# Patient Record
Sex: FEMALE | Race: OTHER | HISPANIC OR LATINO | URBAN - METROPOLITAN AREA
[De-identification: names, ages, dates, MRNs, and addresses within clinical notes are randomized per-mention and may not be internally consistent; named-entity substitution may affect disease eponyms.]

---

## 2023-06-03 ENCOUNTER — EMERGENCY (EMERGENCY)
Facility: HOSPITAL | Age: 53
LOS: 0 days | Discharge: HOME | End: 2023-06-03
Attending: EMERGENCY MEDICINE
Payer: MEDICAID

## 2023-06-03 VITALS
DIASTOLIC BLOOD PRESSURE: 56 MMHG | OXYGEN SATURATION: 99 % | HEART RATE: 66 BPM | RESPIRATION RATE: 18 BRPM | TEMPERATURE: 99 F | SYSTOLIC BLOOD PRESSURE: 121 MMHG

## 2023-06-03 DIAGNOSIS — M79.89 OTHER SPECIFIED SOFT TISSUE DISORDERS: ICD-10-CM

## 2023-06-03 LAB
ALBUMIN SERPL ELPH-MCNC: 3.9 G/DL — SIGNIFICANT CHANGE UP (ref 3.5–5.2)
ALP SERPL-CCNC: 71 U/L — SIGNIFICANT CHANGE UP (ref 30–115)
ALT FLD-CCNC: 18 U/L — SIGNIFICANT CHANGE UP (ref 0–41)
ANION GAP SERPL CALC-SCNC: 9 MMOL/L — SIGNIFICANT CHANGE UP (ref 7–14)
AST SERPL-CCNC: 22 U/L — SIGNIFICANT CHANGE UP (ref 0–41)
BASOPHILS # BLD AUTO: 0.03 K/UL — SIGNIFICANT CHANGE UP (ref 0–0.2)
BASOPHILS NFR BLD AUTO: 0.5 % — SIGNIFICANT CHANGE UP (ref 0–1)
BILIRUB SERPL-MCNC: 0.6 MG/DL — SIGNIFICANT CHANGE UP (ref 0.2–1.2)
BUN SERPL-MCNC: 8 MG/DL — LOW (ref 10–20)
CALCIUM SERPL-MCNC: 8.8 MG/DL — SIGNIFICANT CHANGE UP (ref 8.4–10.5)
CHLORIDE SERPL-SCNC: 107 MMOL/L — SIGNIFICANT CHANGE UP (ref 98–110)
CO2 SERPL-SCNC: 24 MMOL/L — SIGNIFICANT CHANGE UP (ref 17–32)
CREAT SERPL-MCNC: 0.7 MG/DL — SIGNIFICANT CHANGE UP (ref 0.7–1.5)
EGFR: 104 ML/MIN/1.73M2 — SIGNIFICANT CHANGE UP
EOSINOPHIL # BLD AUTO: 0.11 K/UL — SIGNIFICANT CHANGE UP (ref 0–0.7)
EOSINOPHIL NFR BLD AUTO: 1.7 % — SIGNIFICANT CHANGE UP (ref 0–8)
GLUCOSE SERPL-MCNC: 111 MG/DL — HIGH (ref 70–99)
HCT VFR BLD CALC: 38.8 % — SIGNIFICANT CHANGE UP (ref 37–47)
HGB BLD-MCNC: 13.1 G/DL — SIGNIFICANT CHANGE UP (ref 12–16)
IMM GRANULOCYTES NFR BLD AUTO: 0.5 % — HIGH (ref 0.1–0.3)
LYMPHOCYTES # BLD AUTO: 1.82 K/UL — SIGNIFICANT CHANGE UP (ref 1.2–3.4)
LYMPHOCYTES # BLD AUTO: 28 % — SIGNIFICANT CHANGE UP (ref 20.5–51.1)
MCHC RBC-ENTMCNC: 29.6 PG — SIGNIFICANT CHANGE UP (ref 27–31)
MCHC RBC-ENTMCNC: 33.8 G/DL — SIGNIFICANT CHANGE UP (ref 32–37)
MCV RBC AUTO: 87.6 FL — SIGNIFICANT CHANGE UP (ref 81–99)
MONOCYTES # BLD AUTO: 0.61 K/UL — HIGH (ref 0.1–0.6)
MONOCYTES NFR BLD AUTO: 9.4 % — HIGH (ref 1.7–9.3)
NEUTROPHILS # BLD AUTO: 3.9 K/UL — SIGNIFICANT CHANGE UP (ref 1.4–6.5)
NEUTROPHILS NFR BLD AUTO: 59.9 % — SIGNIFICANT CHANGE UP (ref 42.2–75.2)
NRBC # BLD: 0 /100 WBCS — SIGNIFICANT CHANGE UP (ref 0–0)
PLATELET # BLD AUTO: 220 K/UL — SIGNIFICANT CHANGE UP (ref 130–400)
PMV BLD: 12.1 FL — HIGH (ref 7.4–10.4)
POTASSIUM SERPL-MCNC: 4.3 MMOL/L — SIGNIFICANT CHANGE UP (ref 3.5–5)
POTASSIUM SERPL-SCNC: 4.3 MMOL/L — SIGNIFICANT CHANGE UP (ref 3.5–5)
PROT SERPL-MCNC: 5.7 G/DL — LOW (ref 6–8)
RBC # BLD: 4.43 M/UL — SIGNIFICANT CHANGE UP (ref 4.2–5.4)
RBC # FLD: 13.3 % — SIGNIFICANT CHANGE UP (ref 11.5–14.5)
SODIUM SERPL-SCNC: 140 MMOL/L — SIGNIFICANT CHANGE UP (ref 135–146)
WBC # BLD: 6.5 K/UL — SIGNIFICANT CHANGE UP (ref 4.8–10.8)
WBC # FLD AUTO: 6.5 K/UL — SIGNIFICANT CHANGE UP (ref 4.8–10.8)

## 2023-06-03 PROCEDURE — 85025 COMPLETE CBC W/AUTO DIFF WBC: CPT

## 2023-06-03 PROCEDURE — 80053 COMPREHEN METABOLIC PANEL: CPT

## 2023-06-03 PROCEDURE — 93970 EXTREMITY STUDY: CPT | Mod: 26

## 2023-06-03 PROCEDURE — 99284 EMERGENCY DEPT VISIT MOD MDM: CPT | Mod: 25

## 2023-06-03 PROCEDURE — 93970 EXTREMITY STUDY: CPT

## 2023-06-03 PROCEDURE — 99284 EMERGENCY DEPT VISIT MOD MDM: CPT

## 2023-06-03 PROCEDURE — 36415 COLL VENOUS BLD VENIPUNCTURE: CPT

## 2023-06-03 RX ORDER — ACETAMINOPHEN 500 MG
650 TABLET ORAL ONCE
Refills: 0 | Status: DISCONTINUED | OUTPATIENT
Start: 2023-06-03 | End: 2023-06-03

## 2023-06-03 NOTE — ED PROVIDER NOTE - PHYSICAL EXAMINATION
CONSTITUTIONAL: Well-appearing; well-nourished; in no apparent distress.   NECK: Supple; non-tender; no cervical lymphadenopathy.   CARDIOVASCULAR: Normal S1, S2; no murmurs, rubs, or gallops.   RESPIRATORY: Normal chest excursion with respiration; breath sounds clear and equal bilaterally; no wheezes, rhonchi, or rales.  GI/: Non-distended; non-tender; no palpable organomegaly.   MS: b/l LE swelling; No evidence of trauma or deformity. Normal ROM in all four extremities; non-tender to palpation; distal pulses are normal.   SKIN: Normal for age and race; warm; dry; good turgor; no apparent lesions or exudate.   NEURO/PSYCH: A & O x 4; grossly unremarkable. mood and manner are appropriate.

## 2023-06-03 NOTE — ED ADULT NURSE NOTE - NSFALLUNIVINTERV_ED_ALL_ED
Bed/Stretcher in lowest position, wheels locked, appropriate side rails in place/Call bell, personal items and telephone in reach/Instruct patient to call for assistance before getting out of bed/chair/stretcher/Non-slip footwear applied when patient is off stretcher/[place] to call system/Physically safe environment - no spills, clutter or unnecessary equipment/Purposeful proactive rounding/Room/bathroom lighting operational, light cord in reach

## 2023-06-03 NOTE — ED PROVIDER NOTE - NS ED ATTENDING STATEMENT MOD
This was a shared visit with the [facility]. I reviewed and verified the documentation and independently performed the documented:

## 2023-06-03 NOTE — ED PROVIDER NOTE - ATTENDING APP SHARED VISIT CONTRIBUTION OF CARE
51 yo f with no pmh presents with b/l lower ext swelling x 2 weeks.  denies leg pain.  no redness.  pt admits had recently immigrated from [place] then [place].  pt denies sob or cp.  pt admits has been eating a lot of salt since arriving to [place].  exam: nad, ncat, perrl, eomi, mmm, rrr, ctab, abd soft, nt, nd aox3, no calf tenderness, b/l leg swelling, but only minimally pitting imp: pt with b/l leg swelling, will duplex.  pt to f/u with med clinic for further eval.

## 2023-06-03 NOTE — ED PROVIDER NOTE - OBJECTIVE STATEMENT
pt with no known pmhx presents to ED c/o b/l LE swelling for approx 2 weeks. sts sxs started when sleeping on the floor of [place] in [place] after flying there from [place] on 5/6 and subsequently flew to  5/20. Denies fever/chill/HA/dizziness/chest pain/palpitation/sob/abd pain/n/v/d/ black stool/bloody stool/urinary sxs

## 2023-06-03 NOTE — ED PROVIDER NOTE - PATIENT PORTAL LINK FT
You can access the FollowMyHealth Patient Portal offered by [facility] by registering at the following website: [email or web address]. By joining [employer]’s FollowMyHealth portal, you will also be able to view your health information using other applications (apps) compatible with our system.

## 2024-06-02 ENCOUNTER — EMERGENCY (EMERGENCY)
Facility: HOSPITAL | Age: 54
LOS: 0 days | Discharge: HOME | End: 2024-06-02
Attending: STUDENT IN AN ORGANIZED HEALTH CARE EDUCATION/TRAINING PROGRAM
Payer: COMMERCIAL

## 2024-06-02 VITALS
RESPIRATION RATE: 17 BRPM | WEIGHT: 185.19 LBS | OXYGEN SATURATION: 98 % | TEMPERATURE: 98 F | SYSTOLIC BLOOD PRESSURE: 131 MMHG | HEART RATE: 67 BPM | DIASTOLIC BLOOD PRESSURE: 79 MMHG

## 2024-06-02 DIAGNOSIS — M54.50 LOW BACK PAIN, UNSPECIFIED: ICD-10-CM

## 2024-06-02 PROCEDURE — 99283 EMERGENCY DEPT VISIT LOW MDM: CPT | Mod: 25

## 2024-06-02 PROCEDURE — 99284 EMERGENCY DEPT VISIT MOD MDM: CPT | Mod: 25

## 2024-06-02 PROCEDURE — 96372 THER/PROPH/DIAG INJ SC/IM: CPT

## 2024-06-02 RX ORDER — METHOCARBAMOL 500 MG/1
1500 TABLET, FILM COATED ORAL ONCE
Refills: 0 | Status: COMPLETED | OUTPATIENT
Start: 2024-06-02 | End: 2024-06-02

## 2024-06-02 RX ORDER — KETOROLAC TROMETHAMINE 30 MG/ML
30 SYRINGE (ML) INJECTION ONCE
Refills: 0 | Status: DISCONTINUED | OUTPATIENT
Start: 2024-06-02 | End: 2024-06-02

## 2024-06-02 RX ORDER — METHOCARBAMOL 500 MG/1
2 TABLET, FILM COATED ORAL
Qty: 18 | Refills: 0
Start: 2024-06-02 | End: 2024-06-04

## 2024-06-02 RX ORDER — LIDOCAINE 4 G/100G
1 CREAM TOPICAL ONCE
Refills: 0 | Status: COMPLETED | OUTPATIENT
Start: 2024-06-02 | End: 2024-06-02

## 2024-06-02 RX ORDER — LIDOCAINE 4 G/100G
1 CREAM TOPICAL
Qty: 1 | Refills: 0
Start: 2024-06-02 | End: 2024-06-06

## 2024-06-02 RX ADMIN — LIDOCAINE 1 PATCH: 4 CREAM TOPICAL at 23:43

## 2024-06-02 RX ADMIN — Medication 30 MILLIGRAM(S): at 23:43

## 2024-06-02 RX ADMIN — METHOCARBAMOL 1500 MILLIGRAM(S): 500 TABLET, FILM COATED ORAL at 23:43

## 2024-06-02 NOTE — ED PROVIDER NOTE - CLINICAL SUMMARY MEDICAL DECISION MAKING FREE TEXT BOX
53-year-old female that presents with back pain concern for musculoskeletal neurovascularly intact no red flags.  Will give pain management DC with PCP follow-up with return precautions.   Appropriate medications for patient's presenting complaints were ordered and effects were reassessed.  Patient's records (prior hospital, ED visit, and/or nursing home notes if available) were reviewed.  Additional history was obtained from EMS, family, and/or PCP (where available).  Escalation to admission/observation was considered.   However patient feels much better and is comfortable with discharge.  Appropriate follow-up was arranged.     I have discussed the discharge plan with the patient. The patient agrees with the plan, as discussed.  The patient understands Emergency Department diagnosis is a preliminary diagnosis often based on limited information and that the patient must adhere to the follow-up plan as discussed.  The patient understands that if the symptoms worsen or if prescribed medications do not have the desired/planned effect that the patient may return to the Emergency Department at any time for further evaluation and treatment.

## 2024-06-02 NOTE — ED PROVIDER NOTE - OBJECTIVE STATEMENT
52 yo F with no pmhx presenting with right sided lower back pain radiating to right lower leg over the last 10 days. Symptoms are moderate. Pain is worse with movement. Pain was better with motrin but over the last 2 days it has not been helping. No urinary or bowel incontinence. No urinary symptoms. No trauma. No rash or IVDU. No fevers or chills.

## 2024-06-02 NOTE — ED PROVIDER NOTE - PATIENT PORTAL LINK FT
You can access the FollowMyHealth Patient Portal offered by [facility] by registering at the following website: [email or web address]. By joining [employer]’s FollowMyHealth portal, you will also be able to view your health information using other applications (apps) compatible with our system.

## 2024-06-02 NOTE — ED PROVIDER NOTE - PHYSICAL EXAMINATION
VITAL SIGNS: I have reviewed nursing notes and confirm.  CONSTITUTIONAL: Patient is in no acute distress.  SKIN: Skin exam is warm and dry, no acute rash.  HEAD: Normocephalic; atraumatic.  EYES: PERRL, EOM intact; conjunctiva and sclera clear.  ENT: No nasal discharge; airway clear.   NECK: Supple; non tender.  CARD: S1, S2 normal; no murmurs, gallops, or rubs. Regular rate and rhythm.  RESP: Clear to auscultation bilaterally. No wheezes, rales or rhonchi.  ABD: Normal bowel sounds; soft; non-distended; non-tender.   EXT/MSK: Normal ROM. No edema. +Tenderness to right paraspinal of lumbar region. No midline tenderness.   LYMPH: No acute cervical adenopathy.  NEURO: Alert, oriented. Grossly unremarkable. No focal deficits.  PSYCH: Cooperative, appropriate.

## 2024-06-02 NOTE — ED PROVIDER NOTE - ATTENDING APP SHARED VISIT CONTRIBUTION OF CARE
53 yr old f w/ no pmh who presents with back pain.  Patient states that for the last 10 days she has been having right lower back pain rating down her right leg.  Patient reports that she has been taking Motrin with minimal relief.  Patient denies any trauma numbness tingling IV drug use fevers urinary fecal incontinence hematuria urinary symptoms or any other medical complaints.    VITAL SIGNS: I have reviewed nursing notes and confirm.  CONSTITUTIONAL: non-toxic, well appearing  SKIN: no rash, no petechiae.  EYES: PERRL, EOMI, pink conjunctiva, anicteric  ENT: tongue midline, no exudates, MMM  NECK: Supple; no meningismus, no JVD  CARD: RRR, no murmurs, equal radial pulses bilaterally 2+  RESP: CTAB, no respiratory distress  ABD: Soft, non-tender, non-distended, no peritoneal signs, no HSM, no CVA tenderness  EXT: Normal ROM x4. No edema. No calves tenderness  NEURO: Alert, oriented x3. CN2-12 intact, equal strength bilaterally, nl gait.  PSYCH: Cooperative, appropriate.      53-year-old female that presents with back pain concern for musculoskeletal neurovascularly intact no red flags.  Will give pain management DC with PCP follow-up with return precautions.

## 2024-06-02 NOTE — ED ADULT TRIAGE NOTE - CHIEF COMPLAINT QUOTE
Pt came c/o right hip pain radiating down the leg, took Ibuprofen without improvement, denies any injuries.

## 2024-06-02 NOTE — ED PROVIDER NOTE - NSFOLLOWUPINSTRUCTIONS_ED_ALL_ED_FT
Nuestros coordinadores de referencias del departamento de emergencias se comunicarán con usted en las próximas 24 a  48 horas de 9:00 a. m. a 5:00 p. m. (de lunes a viernes) con josiah cliff de seguimiento. Espere josiah llamada telefónica del hospital en martín período de tiempo. Si no recibe josiah llamada o si tiene alguna pregunta o inquietud, puede comunicarse con km al [phone].     Dolor de espalda    LO QUE NECESITA SABER:    ¿Qué necesito saber acerca del dolor de espalda?El dolor de espalda es común. Usted puede tener dolor de espalda y espasmos musculares. Usted puede estar adolorido o sentir rigidez en brittani o ambos lados de la espalda. El dolor se puede propagar a la parte baja del cuerpo.    ¿Qué aumenta mi riesgo de tener dolor de espalda?    Josiah enfermedad que afecta la columna vertebral, las articulaciones o los músculos, pat la tensión muscular o los problemas de disco    Inclinarse o levantar de josiah forma repetitiva o girar o levantar artículos pesados    Lesión debido a josiah caída o accidente    Falta de actividad física regular    Obesidad o embarazo    Fumar    Envejecimiento    Manejar, estar sentado o de pie por mucho tiempo    Mayra postura mientras está sentado o de pie  ¿Cómo se diagnostica mcintosh dolor de espalda?Mcintosh médico le preguntará si tiene alguna condición médica. Él podría preguntarle si tiene antecedentes de dolor de espalda y cómo comenzó. Observará cómo se pone de pie y camina y revisará mcintosh rango de movimiento. Muéstrele dónde siente el dolor y qué lo mejora o lo empeora. Explique el dolor, que tan betsy es y el tiempo que le dura. Dígale si el dolor empeora por las noches o cuando se recuesta boca arriba.    ¿Cuál es el tratamiento para el dolor de espalda?    Medicamentos:  AINEpueden disminuir la inflamación y el dolor o la fiebre. Judie medicamento está disponible con o sin josiah receta médica. Los GABBY pueden causar sangrado estomacal o problemas renales en ciertas personas. Si usted catie un medicamento anticoagulante, siempre pregúntele a mcintosh médico si los GABBY son seguros para usted. Siempre corrie la etiqueta de judie medicamento y siga las instrucciones.    Acetaminofénalivia el dolor y baja la fiebre. Está disponible sin receta médica. Pregunte la cantidad y la frecuencia con que debe tomarlos. Siga las indicaciones. Corrie las etiquetas de todos los demás medicamentos que esté usando para saber si también contienen acetaminofén, o pregunte a mcintosh médico o farmacéutico. El acetaminofén puede causar daño en el hígado cuando no se catie de forma correcta.    Relajantes muscularesayudan a disminuir los espasmos musculares y el dolor de espalda.    La acupresiónse puede recomendar para disminuir el dolor y mejorar el movimiento. La acupresión es josiah presión o un masaje localizado en la kaushal del dolor de espalda.    Josiah unidad de electroestimulación nerviosa transcutánea (TENS)es un dispositivo portátil, tamaño de bolsillo, alimentado por batería, que se adhiere a la piel. Por lo general se coloca sobre el área dolorida. Usa señales eléctricas leves y seguras para ayudar a controlar el dolor.  ¿Cómo puedo controlar el dolor de espalda?    Aplique hieloen la espalda de 15 a 20 minutos cada hora o pat se le indique. Use josiah compresa de hielo o ponga hielo triturado en josiah bolsa de plástico. Cúbralo con josiah toalla antes de aplicarlo sobre mcintosh piel. El hielo disminuye el dolor y ayuda a evitar el daño en los tejidos.    Aplique caloren la espalda de 20 a 30 minutos cada 2 horas por los días que le indiquen. El calor ayuda a disminuir el dolor y los espasmos musculares.    Manténgase activolo más que pueda sin causar más dolor. El reposo en cama puede empeorar mcintosh dolor de espalda. Evite levantar objetos hasta que ya no tenga dolor.  Chau hispana caminando pat ejercicio      Vaya a terapia físicasegún las indicaciones. Un fisioterapeuta puede enseñarle ejercicios que contribuyan a mejorar mcintosh movimiento y fuerza, y a aliviar el dolor.  Llame al número de emergencias local (911 en los [place]) si:    Usted tiene dolor de espalda intenso con dolor en el pecho.    Usted no puede controlar mcintosh orina ni greg deposiciones intestinales.    El dolor se vuelve tan intenso que lo incapacita para caminar.  ¿Cuándo nydia buscar atención inmediata?    Usted tiene dolor, entumecimiento o debilidad en josiah o en ambas piernas.    Usted tiene dolor de espalda intenso, náuseas y vómito.    Usted tiene un dolor de espalda intenso que se propaga a un costado o al área genital.  ¿Cuándo nydia llamar a mi médico?    Usted tiene dolor de espalda que no mejora con el reposo, ni con el medicamento para el dolor.    Tiene fiebre.    Usted tiene un dolor que empeora cuando está acostado boca arriba o al descansar.    Usted tiene dolor que empeora cuando tose o estornuda.    Usted pierde peso sin proponérselo.    Usted tiene preguntas o inquietudes acerca de mcintosh condición o cuidado.  ACUERDOS SOBRE MCINTOSH CUIDADO:    Usted tiene el derecho de ayudar a planear mcintosh cuidado. Aprenda todo lo que pueda sobre mcintosh condición y pat darle tratamiento. Discuta greg opciones de tratamiento con greg médicos para decidir el cuidado que usted desea recibir. Usted siempre tiene el derecho de rechazar el tratamiento.

## 2024-09-02 ENCOUNTER — EMERGENCY (EMERGENCY)
Facility: HOSPITAL | Age: 54
LOS: 0 days | Discharge: HOME | End: 2024-09-03
Attending: STUDENT IN AN ORGANIZED HEALTH CARE EDUCATION/TRAINING PROGRAM
Payer: MEDICAID

## 2024-09-02 VITALS
HEART RATE: 69 BPM | DIASTOLIC BLOOD PRESSURE: 62 MMHG | RESPIRATION RATE: 18 BRPM | SYSTOLIC BLOOD PRESSURE: 98 MMHG | TEMPERATURE: 98 F | OXYGEN SATURATION: 99 % | WEIGHT: 154.32 LBS

## 2024-09-02 DIAGNOSIS — R11.0 NAUSEA: ICD-10-CM

## 2024-09-02 DIAGNOSIS — R51.9 HEADACHE, UNSPECIFIED: ICD-10-CM

## 2024-09-02 DIAGNOSIS — J02.9 ACUTE PHARYNGITIS, UNSPECIFIED: ICD-10-CM

## 2024-09-02 DIAGNOSIS — H40.212 ACUTE ANGLE-CLOSURE GLAUCOMA, LEFT EYE: ICD-10-CM

## 2024-09-02 PROCEDURE — 85025 COMPLETE CBC W/AUTO DIFF WBC: CPT

## 2024-09-02 PROCEDURE — 83735 ASSAY OF MAGNESIUM: CPT

## 2024-09-02 PROCEDURE — 80053 COMPREHEN METABOLIC PANEL: CPT

## 2024-09-02 PROCEDURE — 36415 COLL VENOUS BLD VENIPUNCTURE: CPT

## 2024-09-02 PROCEDURE — 99284 EMERGENCY DEPT VISIT MOD MDM: CPT | Mod: 25

## 2024-09-02 PROCEDURE — 96375 TX/PRO/DX INJ NEW DRUG ADDON: CPT

## 2024-09-02 PROCEDURE — 86140 C-REACTIVE PROTEIN: CPT

## 2024-09-02 PROCEDURE — 96374 THER/PROPH/DIAG INJ IV PUSH: CPT

## 2024-09-02 PROCEDURE — 85652 RBC SED RATE AUTOMATED: CPT

## 2024-09-02 PROCEDURE — 99291 CRITICAL CARE FIRST HOUR: CPT | Mod: 25

## 2024-09-03 ENCOUNTER — OUTPATIENT (OUTPATIENT)
Dept: OUTPATIENT SERVICES | Facility: HOSPITAL | Age: 54
LOS: 1 days | End: 2024-09-03
Payer: MEDICAID

## 2024-09-03 ENCOUNTER — APPOINTMENT (OUTPATIENT)
Dept: OPHTHALMOLOGY | Facility: CLINIC | Age: 54
End: 2024-09-03
Payer: MEDICAID

## 2024-09-03 VITALS
RESPIRATION RATE: 17 BRPM | DIASTOLIC BLOOD PRESSURE: 60 MMHG | SYSTOLIC BLOOD PRESSURE: 105 MMHG | HEART RATE: 70 BPM | OXYGEN SATURATION: 98 %

## 2024-09-03 DIAGNOSIS — H53.8 OTHER VISUAL DISTURBANCES: ICD-10-CM

## 2024-09-03 PROBLEM — Z00.00 ENCOUNTER FOR PREVENTIVE HEALTH EXAMINATION: Status: ACTIVE | Noted: 2024-09-03

## 2024-09-03 LAB
ALBUMIN SERPL ELPH-MCNC: 4.5 G/DL — SIGNIFICANT CHANGE UP (ref 3.5–5.2)
ALP SERPL-CCNC: 92 U/L — SIGNIFICANT CHANGE UP (ref 30–115)
ALT FLD-CCNC: 14 U/L — SIGNIFICANT CHANGE UP (ref 0–41)
ANION GAP SERPL CALC-SCNC: 12 MMOL/L — SIGNIFICANT CHANGE UP (ref 7–14)
AST SERPL-CCNC: 20 U/L — SIGNIFICANT CHANGE UP (ref 0–41)
BASOPHILS # BLD AUTO: 0.02 K/UL — SIGNIFICANT CHANGE UP (ref 0–0.2)
BASOPHILS NFR BLD AUTO: 0.3 % — SIGNIFICANT CHANGE UP (ref 0–1)
BILIRUB SERPL-MCNC: 0.6 MG/DL — SIGNIFICANT CHANGE UP (ref 0.2–1.2)
BUN SERPL-MCNC: 17 MG/DL — SIGNIFICANT CHANGE UP (ref 10–20)
CALCIUM SERPL-MCNC: 9.3 MG/DL — SIGNIFICANT CHANGE UP (ref 8.4–10.5)
CHLORIDE SERPL-SCNC: 104 MMOL/L — SIGNIFICANT CHANGE UP (ref 98–110)
CO2 SERPL-SCNC: 21 MMOL/L — SIGNIFICANT CHANGE UP (ref 17–32)
CREAT SERPL-MCNC: 0.8 MG/DL — SIGNIFICANT CHANGE UP (ref 0.7–1.5)
CRP SERPL-MCNC: 12.1 MG/L — HIGH
EGFR: 88 ML/MIN/1.73M2 — SIGNIFICANT CHANGE UP
EOSINOPHIL # BLD AUTO: 0.08 K/UL — SIGNIFICANT CHANGE UP (ref 0–0.7)
EOSINOPHIL NFR BLD AUTO: 1.1 % — SIGNIFICANT CHANGE UP (ref 0–8)
ERYTHROCYTE [SEDIMENTATION RATE] IN BLOOD: 15 MM/HR — SIGNIFICANT CHANGE UP (ref 0–20)
GLUCOSE SERPL-MCNC: 104 MG/DL — HIGH (ref 70–99)
HCT VFR BLD CALC: 35.7 % — LOW (ref 37–47)
HGB BLD-MCNC: 11.5 G/DL — LOW (ref 12–16)
IMM GRANULOCYTES NFR BLD AUTO: 0.4 % — HIGH (ref 0.1–0.3)
LYMPHOCYTES # BLD AUTO: 1.52 K/UL — SIGNIFICANT CHANGE UP (ref 1.2–3.4)
LYMPHOCYTES # BLD AUTO: 21.1 % — SIGNIFICANT CHANGE UP (ref 20.5–51.1)
MAGNESIUM SERPL-MCNC: 2.2 MG/DL — SIGNIFICANT CHANGE UP (ref 1.8–2.4)
MCHC RBC-ENTMCNC: 24.1 PG — LOW (ref 27–31)
MCHC RBC-ENTMCNC: 32.2 G/DL — SIGNIFICANT CHANGE UP (ref 32–37)
MCV RBC AUTO: 74.7 FL — LOW (ref 81–99)
MONOCYTES # BLD AUTO: 0.81 K/UL — HIGH (ref 0.1–0.6)
MONOCYTES NFR BLD AUTO: 11.3 % — HIGH (ref 1.7–9.3)
NEUTROPHILS # BLD AUTO: 4.73 K/UL — SIGNIFICANT CHANGE UP (ref 1.4–6.5)
NEUTROPHILS NFR BLD AUTO: 65.8 % — SIGNIFICANT CHANGE UP (ref 42.2–75.2)
NRBC # BLD: 0 /100 WBCS — SIGNIFICANT CHANGE UP (ref 0–0)
PLATELET # BLD AUTO: 208 K/UL — SIGNIFICANT CHANGE UP (ref 130–400)
PMV BLD: 10.7 FL — HIGH (ref 7.4–10.4)
POTASSIUM SERPL-MCNC: 3.9 MMOL/L — SIGNIFICANT CHANGE UP (ref 3.5–5)
POTASSIUM SERPL-SCNC: 3.9 MMOL/L — SIGNIFICANT CHANGE UP (ref 3.5–5)
PROT SERPL-MCNC: 6.8 G/DL — SIGNIFICANT CHANGE UP (ref 6–8)
RBC # BLD: 4.78 M/UL — SIGNIFICANT CHANGE UP (ref 4.2–5.4)
RBC # FLD: 15.1 % — HIGH (ref 11.5–14.5)
SODIUM SERPL-SCNC: 137 MMOL/L — SIGNIFICANT CHANGE UP (ref 135–146)
WBC # BLD: 7.19 K/UL — SIGNIFICANT CHANGE UP (ref 4.8–10.8)
WBC # FLD AUTO: 7.19 K/UL — SIGNIFICANT CHANGE UP (ref 4.8–10.8)

## 2024-09-03 PROCEDURE — 66761 REVISION OF IRIS: CPT | Mod: LT

## 2024-09-03 PROCEDURE — 92133 CPTRZD OPH DX IMG PST SGM ON: CPT | Mod: 26

## 2024-09-03 PROCEDURE — 92004 COMPRE OPH EXAM NEW PT 1/>: CPT | Mod: 25

## 2024-09-03 PROCEDURE — 67028 INJECTION EYE DRUG: CPT | Mod: LT

## 2024-09-03 PROCEDURE — 92004 COMPRE OPH EXAM NEW PT 1/>: CPT

## 2024-09-03 PROCEDURE — 92133 CPTRZD OPH DX IMG PST SGM ON: CPT

## 2024-09-03 RX ORDER — DIPHENHYDRAMINE HYDROCHLORIDE AND LIDOCAINE HYDROCHLORIDE AND ALUMINUM HYDROXIDE AND MAGNESIUM HYDRO
30 KIT
Qty: 1 | Refills: 0
Start: 2024-09-03 | End: 2024-09-06

## 2024-09-03 RX ORDER — TIMOLOL MALEATE 5 MG/ML
1 SOLUTION/ DROPS OPHTHALMIC ONCE
Refills: 0 | Status: COMPLETED | OUTPATIENT
Start: 2024-09-03 | End: 2024-09-03

## 2024-09-03 RX ORDER — BRIMONIDINE TARTRATE 2 MG/ML
1 SOLUTION/ DROPS OPHTHALMIC ONCE
Refills: 0 | Status: COMPLETED | OUTPATIENT
Start: 2024-09-03 | End: 2024-09-03

## 2024-09-03 RX ORDER — DORZOLAMIDE HCL 2 %
1 DROPS OPHTHALMIC (EYE) ONCE
Refills: 0 | Status: COMPLETED | OUTPATIENT
Start: 2024-09-03 | End: 2024-09-03

## 2024-09-03 RX ORDER — DORZOLAMIDE HCL/TIMOLOL MALEAT 22.3-6.8/1
1 DROPS OPHTHALMIC (EYE) ONCE
Refills: 0 | Status: DISCONTINUED | OUTPATIENT
Start: 2024-09-03 | End: 2024-09-03

## 2024-09-03 RX ORDER — DIPHENHYDRAMINE HYDROCHLORIDE AND LIDOCAINE HYDROCHLORIDE AND ALUMINUM HYDROXIDE AND MAGNESIUM HYDRO
1 KIT
Qty: 1 | Refills: 0
Start: 2024-09-03 | End: 2024-09-06

## 2024-09-03 RX ORDER — TIMOLOL MALEATE 5 MG/ML
1 SOLUTION/ DROPS OPHTHALMIC ONCE
Refills: 0 | Status: DISCONTINUED | OUTPATIENT
Start: 2024-09-03 | End: 2024-09-03

## 2024-09-03 RX ORDER — MANNITOL 40 MG/1
75 CAPSULE RESPIRATORY (INHALATION) ONCE
Refills: 0 | Status: DISCONTINUED | OUTPATIENT
Start: 2024-09-03 | End: 2024-09-03

## 2024-09-03 RX ORDER — PILOCARPINE HCL 0.5 %
1 DROPS OPHTHALMIC (EYE) ONCE
Refills: 0 | Status: COMPLETED | OUTPATIENT
Start: 2024-09-03 | End: 2024-09-03

## 2024-09-03 RX ORDER — ACETAZOLAMIDE 250 MG
500 TABLET ORAL ONCE
Refills: 0 | Status: COMPLETED | OUTPATIENT
Start: 2024-09-03 | End: 2024-09-03

## 2024-09-03 RX ORDER — METOCLOPRAMIDE HCL 5 MG
10 TABLET ORAL ONCE
Refills: 0 | Status: COMPLETED | OUTPATIENT
Start: 2024-09-03 | End: 2024-09-03

## 2024-09-03 RX ORDER — KETOROLAC TROMETHAMINE 30 MG/ML
30 INJECTION, SOLUTION INTRAMUSCULAR ONCE
Refills: 0 | Status: DISCONTINUED | OUTPATIENT
Start: 2024-09-03 | End: 2024-09-03

## 2024-09-03 RX ADMIN — Medication 104 MILLIGRAM(S): at 01:06

## 2024-09-03 RX ADMIN — KETOROLAC TROMETHAMINE 30 MILLIGRAM(S): 30 INJECTION, SOLUTION INTRAMUSCULAR at 01:06

## 2024-09-03 RX ADMIN — Medication 1000 MILLILITER(S): at 01:07

## 2024-09-03 RX ADMIN — TIMOLOL MALEATE 1 DROP(S): 5 SOLUTION/ DROPS OPHTHALMIC at 05:04

## 2024-09-03 RX ADMIN — Medication 110 MILLIGRAM(S): at 05:03

## 2024-09-03 RX ADMIN — BRIMONIDINE TARTRATE 1 DROP(S): 2 SOLUTION/ DROPS OPHTHALMIC at 05:03

## 2024-09-03 RX ADMIN — KETOROLAC TROMETHAMINE 30 MILLIGRAM(S): 30 INJECTION, SOLUTION INTRAMUSCULAR at 02:10

## 2024-09-03 RX ADMIN — Medication 1 DROP(S): at 05:13

## 2024-09-03 RX ADMIN — Medication 1 DROP(S): at 05:04

## 2024-09-03 NOTE — ED PROVIDER NOTE - CARE PROVIDER_API CALL
Phuc Theodore  Ophthalmology  [address] [facility] [place] [address]  Phone: [phone]  Fax: [phone]  Follow Up Time:

## 2024-09-03 NOTE — ED PROVIDER NOTE - PROGRESS NOTE DETAILS
TC: Unable to get ahold of ophtho residents on call after multiple pages and voicemails. On 2nd call to ophtho attending, I was able to speak to ophtho attending Dr. Phuc Theodore who recommended acetazolamide and mannitol. He can see pt in office today at 8:30am on [address] [facility] for laser therapy. Will start eye drops here as well. FF: pt reports headache has improved. pt repeat tonometry pen shows 48 mmhg in the left eye and 14 mmhg in the right eye FF: pt given diamox ivpb and brimonidine, timolol, pilocarpine and dorzolomide eye drops. rp tonometry of the left eye is 65. pt given brimonide drops again will reassess in 5 minutes. I spoke with optho resident dr. avliez who reports to alternate between timolol and brimonidine drops every 5 minutes. pt can be seen in optho clinic today and dr. avilez will speak with dr. mcnair to discuss pt will be seen optho clinic vs [address] [place] [address] office. pt made aware of plan. FF: pt given another two drops of brominide and 1 drop of timilol. optho resident is coming to the ED to evaluate pt. FF: pt endorsed to dr. rebolledo pending optho consult in the ED. TC: Pt signed out to Dr. Liriano to f/u ophtho resident who is coming to ED to evaluate pt. Most recent IOP 65mmHg TC: Optho paged around 1am. I spoke to ophtho attending Dr. Phuc Theodore who recommended acetazolamide and mannitol. He can see pt in office today at 8:30am on [address] [facility] for laser therapy. Will start eye drops here as well. SUH Gonzalez MD:  SO from [facility]. Opthalmology at bedside assessing pt. SHU Gonzalez MD:  Repeat eye pressure 27.  Daughter at bedside, pt. to to opthalmology clinic right after d/c from ED.  Pt. and daughter understand. SHU Gonzalez MD:  Repeat eye pressure 27.  Daughter at bedside, pt. to to opthalmology clinic right after d/c from ED.  Pt. and daughter understand.    Patient requesting medication for sore throat.  Magic mouthwash sent to pharmacy.

## 2024-09-03 NOTE — ED ADULT NURSE NOTE - NSFALLUNIVINTERV_ED_ALL_ED
Bed/Stretcher in lowest position, wheels locked, appropriate side rails in place/Call bell, personal items and telephone in reach/Instruct patient to call for assistance before getting out of bed/chair/stretcher/Non-slip footwear applied when patient is off stretcher/[place] to call system/Physically safe environment - no spills, clutter or unnecessary equipment/Purposeful proactive rounding/Room/bathroom lighting operational, light cord in reach

## 2024-09-03 NOTE — ED PROVIDER NOTE - CLINICAL SUMMARY MEDICAL DECISION MAKING FREE TEXT BOX
53-year-old female, no past medical history, presented to the ED for left-sided headache, left eye pain, vision changes, nausea.  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  Initial pressure to be greater than 70.  Medications given with improvement in symptoms and intraocular pressure.  Ophthalmology consulted.  Ophthalmology evaluated patient in the ED.  Patient's initial pressure in the morning was 27.  Medications given and patient's pressure less than 20.  Discussed with ophthalmology Dr. Crawford.  Ophthalmology recommending follow-up in the Optho clinic this morning for possible procedure as patient has narrow angles in both eyes.  Discussed results with patient.  Given return precautions and follow-up outpatient.  Patient to go straight to dental clinic after discharge.

## 2024-09-03 NOTE — ED PROVIDER NOTE - PATIENT PORTAL LINK FT
You can access the FollowMyHealth Patient Portal offered by [facility] by registering at the following website: [email or web address]. By joining [employer]’s FollowMyHealth portal, you will also be able to view your health information using other applications (apps) compatible with our system.

## 2024-09-03 NOTE — ED PROVIDER NOTE - ATTENDING APP SHARED VISIT CONTRIBUTION OF CARE
54 yo F with no PMHx who presents with acute onset of L sided headache, L eye pain, blurry vision, nausea which started 3 hrs prior to arrival. No fever, slurred speech, unilateral weakness, numbness, difficulty walking/swallowing, cp, sob, vomiting, recent head trauma. Wears contact lens but not currently. No hx of similar.    No PMD    CONSTITUTIONAL: well developed, nontoxic appearing, in no acute distress, speaking in full sentences  SKIN: warm, dry, no rash, cap refill < 2 seconds  HEENT: normocephalic, atraumatic, L eye scleral injection, R pupil 2mm and reactive, L pupil 4mm and sluggishly reactive, EOMI, L pupil IOP 65-72mmHg, R eye visual acuity 20/70, L eye visual acuity 20/200, moist mucous membranes, patent airway  NECK: supple  CV:  regular rate, regular rhythm, 2+ radial pulses bilaterally  RESP: no wheezes, no rales, no rhonchi, normal work of breathing  ABD: soft, no tenderness, nondistended, no rebound, no guarding  MSK: normal ROM, no cyanosis, no edema  NEURO: alert, oriented, grossly unremarkable  PSYCH: cooperative, appropriate    A&P:  Pt here with acute onset headache due to acute angle glaucoma given elevated IOP of L eye, sluggishly reactive, decreased visual acuity. BP 90s/60s in ED, rest of vitals wnl. No focal neuro deficits concerning for ICH, stroke, meningitis, encephalitis. Plan for ophtho c/s. I personally saw the patient. EDWARD Romero and I provided critical care for a total of 60 minutes. I provided a substantive portion of the care and the majority of the critical care time.      52 yo F with no PMHx who presents with acute onset of L sided headache, L eye pain, blurry vision, nausea which started 3 hrs prior to arrival. No fever, slurred speech, unilateral weakness, numbness, difficulty walking/swallowing, cp, sob, vomiting, recent head trauma. Wears contact lens but not currently. No hx of similar.    No PMD    CONSTITUTIONAL: well developed, nontoxic appearing, in no acute distress, speaking in full sentences  SKIN: warm, dry, no rash, cap refill < 2 seconds  HEENT: normocephalic, atraumatic, L eye scleral injection, R pupil 2mm and reactive, L pupil 4mm and sluggishly reactive, EOMI, L pupil IOP 65-72mmHg, R eye visual acuity 20/70, L eye visual acuity 20/200, moist mucous membranes, patent airway  NECK: supple  CV:  regular rate, regular rhythm, 2+ radial pulses bilaterally  RESP: no wheezes, no rales, no rhonchi, normal work of breathing  ABD: soft, no tenderness, nondistended, no rebound, no guarding  MSK: normal ROM, no cyanosis, no edema  NEURO: alert, oriented, grossly unremarkable  PSYCH: cooperative, appropriate    A&P:  Pt here with acute onset headache due to acute angle glaucoma given elevated IOP of L eye, sluggishly reactive, decreased visual acuity. BP 90s/60s in ED, rest of vitals wnl. No focal neuro deficits concerning for ICH, stroke, meningitis, encephalitis. Plan for ophtho c/s.

## 2024-09-03 NOTE — ED PROVIDER NOTE - PHYSICAL EXAMINATION
Physical Exam    Vital Signs: I have reviewed the initial vital signs.  Constitutional: well-nourished, appears stated age, no acute distress  Eyes: right conjunctiva pink, and sclera clear, (+) left conjunctiva pink and there is scleraln injection. pt left pupil is sluggish, mid dilated and cornea is cloudy, negative hyphema. negative subconjunctival hemorrhage. visual fields intact. visual acuity is: 20/200 in the left eye and 20/ 30 in the right eye. left eye tonometry reveals 72mmhcg  Cardiovascular: regular rate, regular rhythm, well-perfused extremities, radial pulses equal and 2+ b/l.   Respiratory: unlabored respiratory effort, clear to auscultation bilaterally no wheezing, rales and rhonchi. pt is speaking full sentences. no accessory muscle use.   Musculoskeletal: supple neck, FROM of b/l upper and lower extremities  Integumentary: warm, dry, no rash  Neurologic: awake, alert, cranial nerves II-XII grossly intact, extremities’ motor and sensory functions grossly intact. finger to nose intact. negative pronator drift. 5/5 strength throughout. steady gait.   Psychiatric: appropriate mood, appropriate affect Physical Exam    Vital Signs: I have reviewed the initial vital signs.  Constitutional: well-nourished, appears stated age, no acute distress  Eyes: right conjunctiva pink, and sclera clear, (+) left conjunctiva pink and there is scleral injection. pt left pupil is sluggish, mid dilated and cornea is cloudy, negative hyphema. negative subconjunctival hemorrhage. visual fields intact. visual acuity is: 20/200 in the left eye and 20/ 30 in the right eye. left eye tonometry reveals 72mmhg, right eye is 14mmhg  ENT: Oropharynx is clear without lesions. uvula midline. no tonsillar erythema, edema, or exudates. no stridor.  Cardiovascular: regular rate, regular rhythm, well-perfused extremities, radial pulses equal and 2+ b/l.   Respiratory: unlabored respiratory effort, clear to auscultation bilaterally no wheezing, rales and rhonchi. pt is speaking full sentences. no accessory muscle use.   Musculoskeletal: supple neck, FROM of b/l upper and lower extremities  Integumentary: warm, dry, no rash  Neurologic: awake, alert, cranial nerves II-XII grossly intact, extremities’ motor and sensory functions grossly intact. finger to nose intact. negative pronator drift. 5/5 strength throughout. steady gait.   Psychiatric: appropriate mood, appropriate affect

## 2024-09-03 NOTE — ED PROVIDER NOTE - OBJECTIVE STATEMENT
Melanie Bardales #[id number]. 53-year-old Polish-speaking female no significant past medical history presents to the ED for evaluation of left-sided headache that began 3 hours ago.  Patient reports pain in the left eye.  Patient reports the headache is worse with bright light and loud noise.  Patient reports sore throat and nausea.  Patient reports taking ibuprofen about 2 hours prior to arrival in the ED without any relief.  Patient last menstrual period was August 12.  Patient reports she was glasses for reading and for far away.  Patient denies headache, dizziness, neck pain, recent head trauma, use of blood thinners, weakness, numbness, tingling, vomiting, urinary or bowel retention or incontinence, or rashes.

## 2024-09-03 NOTE — CONSULT NOTE ADULT - ASSESSMENT
53yoF with no PMH, presented with left side headache and left eye pain. Overnight, on call attending Dr. Theodore was made aware and recommended topical IOP lowering drops (brimonidine, timolol, pilocarpine) and IV diamox.     On my exam this morning, Vacc 20/50 OD, 20/100 ph 20/70 OS. IOP 14/75 on presentation. Pupil RR OD and mid dilated non reactive OS. Repeat IOP 57 on my initial exam, then this writer gave two more rounds each of timolol and brimonidine with reduction of IOP to 27. Anterior exam notable for trace injection OS, cornea clear OS, narrow angles OU, lens trace NS OU, B scan with flat retina, no vitreitis, optic nerve wnl. Patient education on possible need for LPI and was informed to present to ophthalmology clinic this morning for re-eval in clinic.    Vira Alcala, PGY-2  D/w Dr. Sullivan

## 2024-09-03 NOTE — ED PROVIDER NOTE - NSFOLLOWUPINSTRUCTIONS_ED_ALL_ED_FT
Glaucoma    LO QUE NECESITA SABER:    Glaucoma es josiah enfermedad de los ojos que causa la pérdida de la vista en brittani o ambos ojos. El glaucoma es causado por la acumulación de fluido detrás del billy. Judie líquido pone presión en el nervio óptico y lo daña. Por lo general el glaucoma se desarrolla lentamente.    INSTRUCCIONES SOBRE EL CORRINE HOSPITALARIA:    Medicamentos:    Los medicamentos para la presión ocularayudan a bajar la presión en los ojos. Es posible que también reduzcan la cantidad de líquido que melony ojos producen o que ayuden a melony ojos a drenar mejor el líquido.    [place] melony medicamentos pat se le haya indicado.Consulte con mcintosh médico si usted shawn que mcintosh medicamento no le está ayudando o si presenta efectos secundarios. Infórmele si es alérgico a algún medicamento. Mantenga josiah lista actualizada de los medicamentos, las vitaminas y los productos herbales que catie. Incluya los siguientes datos de los medicamentos: cantidad, frecuencia y motivo de administración. Traiga con usted la lista o los envases de las píldoras a melony citas de seguimiento. Lleve la lista de los medicamentos con usted en jarad de josiah emergencia.    Programe josiah cliff con mcintosh médico u oftalmólogo según indicaciones:Es posible que deba regresar cada 3 o 6 meses para que le controlen la presión ocular. Anote melony preguntas para que se acuerde de hacerlas marisol melony visitas.    Evite comportamientos que aumentan la presión en los ojos:Trate de no hacer fuerza cuando evacue el intestino. No se ponga ropa apretada alrededor del arvin o el pecho. No empuje ni levante objetos que pesen más de 5 libras. Evite las actividades extenuantes.    Evite estar con personas enfermas.La presión en los ojos aumenta al estornudar o toser.    Identificación de alerta médica:Lleve puesto prendas de alerta médica o lleve consigo josiah tarjeta que indique que usted tiene glaucoma. Pregúntele a mcintosh médico dónde conseguir estos artículos.Accesorios de alerta médica     Comuníquese con mcintosh médico u oftalmólogo si:    - Melony síntomas empeoran, aún después del tratamiento.  - El medicamento hace que los ojos le ardan o se le pongan rojos.  - Usted tiene preguntas o inquietudes acerca de mcintosh condición o cuidado.    Regrese a la deepthi de emergencias si:    - Usted pierde la vista de forma repentina.  - Usted tiene la vista borrosa y dolor de alvin muy intenso.  - Usted siente mucho dolor en los ojos y nota cambios en mcintosh vista.  - Usted tiene náuseas y vómitos.

## 2024-09-10 DIAGNOSIS — H25.13 AGE-RELATED NUCLEAR CATARACT, BILATERAL: ICD-10-CM

## 2024-09-10 DIAGNOSIS — H40.212 ACUTE ANGLE-CLOSURE GLAUCOMA, LEFT EYE: ICD-10-CM

## 2024-09-10 DIAGNOSIS — H40.033 ANATOMICAL NARROW ANGLE, BILATERAL: ICD-10-CM

## 2024-09-10 PROBLEM — Z78.9 OTHER SPECIFIED HEALTH STATUS: Chronic | Status: ACTIVE | Noted: 2024-09-03

## 2024-09-18 ENCOUNTER — APPOINTMENT (OUTPATIENT)
Dept: OPHTHALMOLOGY | Facility: CLINIC | Age: 54
End: 2024-09-18
Payer: MEDICAID

## 2024-09-18 ENCOUNTER — OUTPATIENT (OUTPATIENT)
Dept: OUTPATIENT SERVICES | Facility: HOSPITAL | Age: 54
LOS: 1 days | End: 2024-09-18
Payer: MEDICAID

## 2024-09-18 DIAGNOSIS — H53.8 OTHER VISUAL DISTURBANCES: ICD-10-CM

## 2024-09-18 PROCEDURE — 66761 REVISION OF IRIS: CPT | Mod: RT

## 2024-09-18 PROCEDURE — 92012 INTRM OPH EXAM EST PATIENT: CPT | Mod: 25

## 2024-09-18 PROCEDURE — 92134 CPTRZ OPH DX IMG PST SGM RTA: CPT

## 2024-09-18 PROCEDURE — 92012 INTRM OPH EXAM EST PATIENT: CPT

## 2024-09-27 DIAGNOSIS — H40.212 ACUTE ANGLE-CLOSURE GLAUCOMA, LEFT EYE: ICD-10-CM

## 2024-10-09 ENCOUNTER — APPOINTMENT (OUTPATIENT)
Dept: OPHTHALMOLOGY | Facility: CLINIC | Age: 54
End: 2024-10-09

## 2024-10-09 ENCOUNTER — OUTPATIENT (OUTPATIENT)
Dept: OUTPATIENT SERVICES | Facility: HOSPITAL | Age: 54
LOS: 1 days | End: 2024-10-09
Payer: MEDICAID

## 2024-10-09 DIAGNOSIS — H53.8 OTHER VISUAL DISTURBANCES: ICD-10-CM

## 2024-10-09 DIAGNOSIS — H40.212 ACUTE ANGLE-CLOSURE GLAUCOMA, LEFT EYE: ICD-10-CM

## 2024-10-09 PROCEDURE — 92012 INTRM OPH EXAM EST PATIENT: CPT | Mod: 25

## 2024-10-09 PROCEDURE — 92012 INTRM OPH EXAM EST PATIENT: CPT

## 2024-10-09 PROCEDURE — 66761 REVISION OF IRIS: CPT | Mod: LT

## 2024-10-23 ENCOUNTER — OUTPATIENT (OUTPATIENT)
Dept: OUTPATIENT SERVICES | Facility: HOSPITAL | Age: 54
LOS: 1 days | End: 2024-10-23
Payer: MEDICAID

## 2024-10-23 ENCOUNTER — APPOINTMENT (OUTPATIENT)
Dept: OPHTHALMOLOGY | Facility: CLINIC | Age: 54
End: 2024-10-23

## 2024-10-23 DIAGNOSIS — H53.8 OTHER VISUAL DISTURBANCES: ICD-10-CM

## 2024-10-23 PROCEDURE — 66761 REVISION OF IRIS: CPT | Mod: RT

## 2024-10-23 PROCEDURE — 92012 INTRM OPH EXAM EST PATIENT: CPT | Mod: 25

## 2024-10-23 PROCEDURE — 92012 INTRM OPH EXAM EST PATIENT: CPT

## 2024-10-30 ENCOUNTER — APPOINTMENT (OUTPATIENT)
Dept: OPHTHALMOLOGY | Facility: CLINIC | Age: 54
End: 2024-10-30

## 2024-10-30 ENCOUNTER — OUTPATIENT (OUTPATIENT)
Dept: OUTPATIENT SERVICES | Facility: HOSPITAL | Age: 54
LOS: 1 days | End: 2024-10-30
Payer: MEDICAID

## 2024-10-30 DIAGNOSIS — H53.8 OTHER VISUAL DISTURBANCES: ICD-10-CM

## 2024-10-30 PROCEDURE — 92012 INTRM OPH EXAM EST PATIENT: CPT

## 2025-01-13 ENCOUNTER — APPOINTMENT (OUTPATIENT)
Dept: OPHTHALMOLOGY | Facility: CLINIC | Age: 55
End: 2025-01-13

## 2025-02-26 ENCOUNTER — APPOINTMENT (OUTPATIENT)
Dept: OPHTHALMOLOGY | Facility: CLINIC | Age: 55
End: 2025-02-26